# Patient Record
Sex: MALE
[De-identification: names, ages, dates, MRNs, and addresses within clinical notes are randomized per-mention and may not be internally consistent; named-entity substitution may affect disease eponyms.]

---

## 2017-06-23 ENCOUNTER — HOSPITAL ENCOUNTER (EMERGENCY)
Dept: HOSPITAL 31 - C.ER | Age: 42
Discharge: LEFT BEFORE DISCHARGE | End: 2017-06-23
Payer: MEDICAID

## 2017-06-23 VITALS
HEART RATE: 78 BPM | SYSTOLIC BLOOD PRESSURE: 108 MMHG | DIASTOLIC BLOOD PRESSURE: 72 MMHG | TEMPERATURE: 98.7 F | RESPIRATION RATE: 18 BRPM

## 2017-06-23 VITALS — OXYGEN SATURATION: 98 %

## 2017-06-23 DIAGNOSIS — L03.116: Primary | ICD-10-CM

## 2017-06-23 LAB
ALBUMIN/GLOB SERPL: 0.9 {RATIO} (ref 1–2.1)
ALP SERPL-CCNC: 74 U/L (ref 38–126)
ALT SERPL-CCNC: 22 U/L (ref 21–72)
AST SERPL-CCNC: 16 U/L (ref 17–59)
BASOPHILS # BLD AUTO: 0.1 K/UL (ref 0–0.2)
BASOPHILS NFR BLD: 0.9 % (ref 0–2)
BILIRUB SERPL-MCNC: 0.5 MG/DL (ref 0.2–1.3)
BUN SERPL-MCNC: 8 MG/DL (ref 9–20)
CALCIUM SERPL-MCNC: 8.5 MG/DL (ref 8.6–10.4)
CHLORIDE SERPL-SCNC: 98 MMOL/L (ref 98–107)
CO2 SERPL-SCNC: 29 MMOL/L (ref 22–30)
EOSINOPHIL # BLD AUTO: 0.2 K/UL (ref 0–0.7)
EOSINOPHIL NFR BLD: 2.5 % (ref 0–4)
ERYTHROCYTE [DISTWIDTH] IN BLOOD BY AUTOMATED COUNT: 13.6 % (ref 11.5–14.5)
GLOBULIN SER-MCNC: 4.3 GM/DL (ref 2.2–3.9)
GLUCOSE SERPL-MCNC: 117 MG/DL (ref 75–110)
HCT VFR BLD CALC: 37.4 % (ref 35–51)
LYMPHOCYTES # BLD AUTO: 1.7 K/UL (ref 1–4.3)
LYMPHOCYTES NFR BLD AUTO: 23.8 % (ref 20–40)
MCH RBC QN AUTO: 26.5 PG (ref 27–31)
MCHC RBC AUTO-ENTMCNC: 32.9 G/DL (ref 33–37)
MCV RBC AUTO: 80.4 FL (ref 80–94)
MONOCYTES # BLD: 0.7 K/UL (ref 0–0.8)
MONOCYTES NFR BLD: 10.5 % (ref 0–10)
NRBC BLD AUTO-RTO: 0 % (ref 0–2)
PLATELET # BLD: 209 K/UL (ref 130–400)
PMV BLD AUTO: 7.6 FL (ref 7.2–11.7)
POTASSIUM SERPL-SCNC: 3.6 MMOL/L (ref 3.6–5.2)
PROT SERPL-MCNC: 8 G/DL (ref 6.3–8.3)
SODIUM SERPL-SCNC: 139 MMOL/L (ref 132–148)
WBC # BLD AUTO: 7 K/UL (ref 4.8–10.8)

## 2017-06-23 PROCEDURE — 96365 THER/PROPH/DIAG IV INF INIT: CPT

## 2017-06-23 PROCEDURE — 85025 COMPLETE CBC W/AUTO DIFF WBC: CPT

## 2017-06-23 PROCEDURE — 87070 CULTURE OTHR SPECIMN AEROBIC: CPT

## 2017-06-23 PROCEDURE — 99285 EMERGENCY DEPT VISIT HI MDM: CPT

## 2017-06-23 PROCEDURE — 80053 COMPREHEN METABOLIC PANEL: CPT

## 2017-06-23 PROCEDURE — 87181 SC STD AGAR DILUTION PER AGT: CPT

## 2017-06-23 NOTE — C.PDOC
History Of Present Illness


42-year-old male, presents to the emergency department with complaints of pain, 

redness and swelling to left lower leg for the past several days. Patient 

states he had an abscess, and tried to drain it himself, after which symptoms 

worsened, resulting in him coming to the ED for evaluation. Denies fevers, 

numbness/weakness, nausea/vomiting, or any other associated symptoms. No other 

complaints at this time.


Time Seen by Provider: 06/23/17 16:01


Chief Complaint (Nursing): Lower Extremity Problem/Injury


History Per: Patient


History/Exam Limitations: no limitations


Onset/Duration Of Symptoms: Days


Current Symptoms Are (Timing): Still Present


Severity: Moderate





Past Medical History


Reviewed: Historical Data, Nursing Documentation, Vital Signs


Vital Signs: 


 Last Vital Signs











Temp  98.7 F   06/23/17 18:27


 


Pulse  78   06/23/17 18:27


 


Resp  18   06/23/17 18:27


 


BP  108/72   06/23/17 18:27


 


Pulse Ox  98   06/23/17 19:06














- Medical History


PMH: 


   Denies: HTN (denies)


Surgical History: Cholecystectomy (5 years ago)





- [facility] Procedures








IRRIGATION OF EAR (03/21/15)


OTHER SKIN & SUBQ I   D (08/06/15)








Family History: States: No Known Family Hx





- Social History


Hx Tobacco Use: Yes


Hx Alcohol Use: No


Hx Substance Use: Yes (last use 5 hours ago)





- Immunization History


Hx Tetanus Toxoid Vaccination: Yes


Hx Influenza Vaccination: No


Hx Pneumococcal Vaccination: No





Review Of Systems


Except As Marked, All Systems Reviewed And Found Negative.


Constitutional: Negative for: Fever, Chills


Cardiovascular: Negative for: Chest Pain


Respiratory: Negative for: Shortness of Breath


Gastrointestinal: Negative for: Nausea, Vomiting


Musculoskeletal: Positive for: Leg Pain


Neurological: Negative for: Weakness, Numbness, Headache, Dizziness (left)





Physical Exam





- Physical Exam


Appears: Non-toxic, No Acute Distress


Skin: Warm, Dry, No Rash


Head: Atraumatic, Normacephalic


Eye(s): bilateral: Normal Inspection, PERRL, EOMI


Oral Mucosa: Moist


Neck: Normal ROM


Cardiovascular: Rhythm Regular, No Friction Rub, No Murmur


Respiratory: Normal Breath Sounds, No Rales, No Rhonchi, No Wheezing


Gastrointestinal/Abdominal: Soft, No Tenderness


Back: Normal Inspection, No Vertebral Tenderness, No Paraspinal Tenderness


Extremity: Normal ROM, Tenderness, Swelling, Other (rleft medial calf w/ 5x6cm 

purulent area w/ surrounding warmth and erythema that is tender to palpation. 

Pulses intact)


Neurological/Psych: Oriented x3, Normal Speech, Normal Motor, Normal Sensation


Gait: Steady





ED Course And Treatment





- Laboratory Results


Result Diagrams: 


 06/23/17 17:10





 06/23/17 17:10


O2 Sat by Pulse Oximetry: 98





Against Medical Advice





- AMA


Patient Left Against Medical Advice: 


The patient declines admission to the hospital and wishes to leave the 

Emergency Department.  This action is against my medical advice. This decision 

was made with informed refusal. The patient was told that admission to the 

hospital is necessary.  Explanation of the reasons why were discussed.  


The risks of leaving were explained to the patient and include, but are not 

limited to, worsening of known or currently unknown conditions, permanent 

disability and death from undiagnosed or untreated conditions. 


The patient has the capacity to make this informed decision and understands my 

explanation of the current medical problem and risks of leaving. 


The patient voluntarily accepts these risks and signed an AMA form documenting 

our conversation.


The patient was given the opportunity to ask questions and reconsider.  The 

patient was encouraged to return to the Emergency Department at any time for 

further care.








Medical Decision Making


Medical Decision Making: 





Plan:


* CMP


* CBC


* Wound Culture


* Reassess and Disposition





The wound was traced using a skin marking. Zosyn IV ordered. Patient was 

instructed to return to the ED in 2 days if worsened or for wound check. 





Disposition





- Disposition


Referrals: 


[facility] [Outside]


Disposition: AGAINST MEDICAL ADVICE


Disposition Time: 17:58


Condition: GOOD


Additional Instructions: 


Follow up with the medical doctor within 1-2 days. Return to the ED if worsened 

such as redness, pain, fever, or swelling. 


Prescriptions: 


Cephalexin [cephalexin] 500 mg PO BID #20 cap


Ibuprofen [Motrin Tab] 800 mg PO TID #20 tab


Sulfamethoxazole/Trimethoprim [Bactrim  mg-160 mg] 1 tab PO BID #20 tab


Instructions:  Cellulitis (ED)





- Clinical Impression


Clinical Impression: 


 Cellulitis








- PA / NP / Resident Statement


MD/DO has reviewed & agrees with the documentation as recorded.





- Scribe Statement


The provider has reviewed the documentation as recorded by the Scribe (Zunaira 

Ash)








All medical record entries made by the Scribe were at my direction and 

personally dictated by me. I have reviewed the chart and agree that the record 

accurately reflects my personal performance of the history, physical exam, 

medical decision making, and the department course for this patient. I have 

also personally directed, reviewed, and agree with the discharge instructions 

and disposition.

## 2018-04-11 ENCOUNTER — HOSPITAL ENCOUNTER (EMERGENCY)
Dept: HOSPITAL 31 - C.ER | Age: 43
Discharge: HOME | End: 2018-04-11
Payer: MEDICAID

## 2018-04-11 VITALS — TEMPERATURE: 98 F | DIASTOLIC BLOOD PRESSURE: 86 MMHG | SYSTOLIC BLOOD PRESSURE: 126 MMHG

## 2018-04-11 VITALS — OXYGEN SATURATION: 99 %

## 2018-04-11 VITALS — HEART RATE: 78 BPM | RESPIRATION RATE: 18 BRPM

## 2018-04-11 DIAGNOSIS — F11.10: Primary | ICD-10-CM

## 2018-04-11 NOTE — C.PDOC
History Of Present Illness


42-year-old male, is brought to the emergency department by EMS. Patient states 

he decided to IV shoot three bags of Heroin in [employer], after which he 

passed out, patient was found unconscious and 911 was called. He is awake and 

alert x3, Narcan was not administered. Denies any head or neck injuries.


Time Seen by Provider: 04/11/18 14:36


Chief Complaint (Nursing): Substance Abuse


History Per: Patient


History/Exam Limitations: no limitations





Past Medical History


Reviewed: Historical Data, Nursing Documentation, Vital Signs


Vital Signs: 


 Last Vital Signs











Temp  98 F   04/11/18 14:47


 


Pulse  78   04/11/18 14:47


 


Resp  18   04/11/18 14:47


 


BP  126/86   04/11/18 14:47


 


Pulse Ox  99   04/11/18 15:10














- Medical History


PMH: 


   Denies: HTN (denies)


Surgical History: Cholecystectomy (5 years ago)





- [facility] Procedures








IRRIGATION OF EAR (03/21/15)


OTHER SKIN & SUBQ I   D (08/06/15)








Family History: States: No Known Family Hx





- Social History


Hx Tobacco Use: Yes


Hx Alcohol Use: No


Hx Substance Use: Yes (last use 5 hours ago)





- Immunization History


Hx Tetanus Toxoid Vaccination: Yes


Hx Influenza Vaccination: No


Hx Pneumococcal Vaccination: No





Review Of Systems


Cardiovascular: Negative for: Chest Pain, Palpitations


Respiratory: Negative for: Shortness of Breath


Gastrointestinal: Negative for: Vomiting


Neurological: Negative for: Weakness, Numbness, Altered Mental Status


Psych: Negative for: Depression, Suicidal ideation





Physical Exam





- Physical Exam


Appears: Non-toxic, No Acute Distress


Skin: Normal Color, Warm, Dry, No Diaphoretic, No Rash, No Jaundice


Head: Normacephalic


Eye(s): bilateral: PERRL


Nose: Normal


Oral Mucosa: Moist


Lips: Normal Appearing


Neck: Normal ROM


Cardiovascular: Rhythm Regular, No Murmur


Respiratory: Normal Breath Sounds, No Accessory Muscle Use


Extremity: Normal ROM, No Deformity, No Swelling


Neurological/Psych: Oriented x3, Normal Speech (no focal deficit)





ED Course And Treatment


O2 Sat by Pulse Oximetry: 99 (RA)


Pulse Ox Interpretation: Normal


Progress Note: Patient refusing IV fluids of Ed monitoring. He remains awake 

and alert without Narcan and is requesting discharge.





Disposition





- Disposition


Referrals: 


[facility] [Outside]


Disposition: HOME/ ROUTINE


Disposition Time: 14:44


Condition: STABLE


Instructions:  Opioid Use Disorder


Forms:  [employer] (English)





- Clinical Impression


Clinical Impression: 


 Opiate abuse, episodic








- Scribe Statement


The provider has reviewed the documentation as recorded by the Nasir Aleman


Provider Attestation: 








All medical record entries made by the Nasir were at my direction and 

personally dictated by me. I have reviewed the chart and agree that the record 

accurately reflects my personal performance of the history, physical exam, 

medical decision making, and the department course for this patient. I have 

also personally directed, reviewed, and agree with the discharge instructions 

and disposition.